# Patient Record
Sex: FEMALE | Race: WHITE | Employment: PART TIME | URBAN - METROPOLITAN AREA
[De-identification: names, ages, dates, MRNs, and addresses within clinical notes are randomized per-mention and may not be internally consistent; named-entity substitution may affect disease eponyms.]

---

## 2018-04-04 ENCOUNTER — HOSPITAL ENCOUNTER (OUTPATIENT)
Dept: OCCUPATIONAL THERAPY | Facility: CLINIC | Age: 23
Setting detail: THERAPIES SERIES
Discharge: HOME | End: 2018-04-04
Payer: COMMERCIAL

## 2018-04-04 PROCEDURE — 97140 MANUAL THERAPY 1/> REGIONS: CPT

## 2018-04-04 PROCEDURE — 97110 THERAPEUTIC EXERCISES: CPT

## 2018-04-04 PROCEDURE — 97165 OT EVAL LOW COMPLEX 30 MIN: CPT

## 2018-04-18 ENCOUNTER — HOSPITAL ENCOUNTER (OUTPATIENT)
Dept: OCCUPATIONAL THERAPY | Facility: CLINIC | Age: 23
Setting detail: THERAPIES SERIES
Discharge: HOME | End: 2018-04-18
Payer: COMMERCIAL

## 2018-04-18 PROCEDURE — 97140 MANUAL THERAPY 1/> REGIONS: CPT

## 2018-04-18 PROCEDURE — 97110 THERAPEUTIC EXERCISES: CPT

## 2018-04-23 ENCOUNTER — HOSPITAL ENCOUNTER (OUTPATIENT)
Dept: OCCUPATIONAL THERAPY | Facility: CLINIC | Age: 23
Setting detail: THERAPIES SERIES
Discharge: HOME | End: 2018-04-23
Payer: COMMERCIAL

## 2018-04-23 PROCEDURE — 97110 THERAPEUTIC EXERCISES: CPT

## 2018-04-25 ENCOUNTER — HOSPITAL ENCOUNTER (OUTPATIENT)
Dept: OCCUPATIONAL THERAPY | Facility: CLINIC | Age: 23
Setting detail: THERAPIES SERIES
Discharge: HOME | End: 2018-04-25
Payer: COMMERCIAL

## 2018-04-25 PROCEDURE — 97110 THERAPEUTIC EXERCISES: CPT

## 2018-05-07 ENCOUNTER — HOSPITAL ENCOUNTER (OUTPATIENT)
Dept: OCCUPATIONAL THERAPY | Facility: CLINIC | Age: 23
Setting detail: THERAPIES SERIES
Discharge: HOME | End: 2018-05-07
Payer: COMMERCIAL

## 2018-05-07 PROCEDURE — 97110 THERAPEUTIC EXERCISES: CPT

## 2018-05-07 PROCEDURE — 97035 APP MDLTY 1+ULTRASOUND EA 15: CPT

## 2018-05-21 ENCOUNTER — HOSPITAL ENCOUNTER (OUTPATIENT)
Dept: OCCUPATIONAL THERAPY | Facility: CLINIC | Age: 23
Setting detail: THERAPIES SERIES
Discharge: HOME | End: 2018-05-21
Payer: COMMERCIAL

## 2018-05-21 PROCEDURE — 97110 THERAPEUTIC EXERCISES: CPT

## 2018-05-21 PROCEDURE — 97530 THERAPEUTIC ACTIVITIES: CPT

## 2018-05-30 ENCOUNTER — HOSPITAL ENCOUNTER (OUTPATIENT)
Dept: OCCUPATIONAL THERAPY | Facility: CLINIC | Age: 23
Setting detail: THERAPIES SERIES
Discharge: HOME | End: 2018-05-30
Payer: COMMERCIAL

## 2018-05-30 PROCEDURE — 97110 THERAPEUTIC EXERCISES: CPT

## 2018-05-30 PROCEDURE — 97035 APP MDLTY 1+ULTRASOUND EA 15: CPT

## 2018-08-21 ENCOUNTER — OFFICE VISIT (OUTPATIENT)
Dept: OBGYN CLINIC | Age: 23
End: 2018-08-21
Payer: COMMERCIAL

## 2018-08-21 ENCOUNTER — HOSPITAL ENCOUNTER (OUTPATIENT)
Age: 23
Setting detail: SPECIMEN
Discharge: HOME | End: 2018-08-21
Payer: COMMERCIAL

## 2018-08-21 VITALS
BODY MASS INDEX: 23.54 KG/M2 | WEIGHT: 150 LBS | SYSTOLIC BLOOD PRESSURE: 133 MMHG | DIASTOLIC BLOOD PRESSURE: 80 MMHG | HEIGHT: 67 IN | HEART RATE: 102 BPM

## 2018-08-21 DIAGNOSIS — Z01.419 WELL FEMALE EXAM WITH ROUTINE GYNECOLOGICAL EXAM: Primary | ICD-10-CM

## 2018-08-21 DIAGNOSIS — Z32.02 NEGATIVE PREGNANCY TEST: ICD-10-CM

## 2018-08-21 DIAGNOSIS — E28.9 OVARIAN DYSFUNCTION: ICD-10-CM

## 2018-08-21 LAB
CONTROL: PRESENT
PREGNANCY TEST URINE, POC: NEGATIVE

## 2018-08-21 PROCEDURE — 99385 PREV VISIT NEW AGE 18-39: CPT | Performed by: OBSTETRICS & GYNECOLOGY

## 2018-08-21 PROCEDURE — 81025 URINE PREGNANCY TEST: CPT | Performed by: OBSTETRICS & GYNECOLOGY

## 2018-08-21 RX ORDER — BUPROPION HYDROCHLORIDE 150 MG/1
150 TABLET, EXTENDED RELEASE ORAL 2 TIMES DAILY
COMMUNITY

## 2018-08-21 NOTE — PROGRESS NOTES
Merlin Myron  8/21/2018              20 y.o. Chief Complaint   Patient presents with    Gynecologic Exam    Establish Care              No referring provider defined for this encounter. The patient was seen and examined. She has no chief complaint today and is here for her annual exam.  Her bowels are regular. There are no voiding complaints. She denies any bloating. HPI : 22yo G0 for annual exam, no gyn issues. ________________________________________________________________________    Past Medical History:   Diagnosis Date    Anxiety     Depression                                                                    Past Surgical History:   Procedure Laterality Date    WRIST SURGERY  03/05/2018     Family History   Problem Relation Age of Onset    Heart Disease Paternal Grandfather     Cancer Paternal Grandfather         skin    Heart Disease Paternal Grandmother     Cancer Paternal Grandmother         skin    Heart Disease Maternal Grandmother     Heart Disease Maternal Grandfather     Cancer Maternal Grandfather         skin    Cancer Mother         skin     History   Smoking Status    Never Smoker   Smokeless Tobacco    Never Used     History   Alcohol Use No       MEDICATIONS:  Current Outpatient Prescriptions   Medication Sig Dispense Refill    buPROPion (WELLBUTRIN SR) 150 MG extended release tablet Take 150 mg by mouth 2 times daily      Loratadine (CLARITIN PO) Take by mouth       No current facility-administered medications for this visit. ALLERGIES:  Patient has no known allergies. Immunization status: stated as current, but no records available. Gynecologic History:     Patient's last menstrual period was 07/20/2018.     Sexually Active: Yes    STD History: No     Permanent Sterilization: No   Reversible Birth Control: No        Hormone Replacement Exposure: No      Family History of Breast, Ovarian , Colon or Uterine Cancer: No   If YES see scanned groomed female. Her BMI was reviewed. Nutritional decision making was discussed. Skin:  There was a Normal Inspection of the skin without rashes or lesions. There were no rashes. (Papular, Maculopapular, Hives, Pustular, Macular)     There were no lesions (Ulcers, Erythema, Abn. Appearing Nevi)        Lymphatic:  No Lymph Nodes were Palpable in the neck , axilla or groin. Inguinal lymph nodes wnl     Neck and EENT:  The neck was supple. There were no masses     Respiratory: The lungs were auscultated and found to be clear. There were no rales, rhonchi or wheezes. There was a good respiratory effort. Cardiovascular: The heart was in a regular rate and rhythm. . No S3 or S4. There was no murmur appreciated. Location, grade, and radiation are not applicable. Extremities: The patients extremities were without calf tenderness, edema, or varicosities. There was full range of motion in all four extremities. Abdomen: The abdomen was soft and non-tender. There were good bowel sounds in all quadrants and there was no guarding, rebound or rigidity. On evaluation there was no evidence of hepatosplenomegaly and there was no costal vertebral kaveh tenderness bilaterally. No hernias were appreciated. Abdominal Scars: none    Psych: The patient had a normal Orientation to: Time, Place, Person, and Situation  There is no Mood / Affect changes    Breast:  (Chest)  normal appearance, no masses or tenderness  Self breast exams were reviewed in detail. Literature was given. Pelvic Exam:  Vulva and vagina appear normal. Bimanual exam reveals normal uterus and adnexa. Rectal Exam:  def      Musculosk:  Normal Gait and station was noted. Digits were evaluated without abnormal findings. Range of motion, stability and strength were evaluated and found to be appropriate for the patients age. ASSESSMENT:      20 y. o. Annual  1. Well female exam with routine gynecological exam    2. Negative pregnancy test    3. Ovarian dysfunction         Chief Complaint   Patient presents with    Gynecologic Exam    Establish Care          Past Medical History:   Diagnosis Date    Anxiety     Depression          There is no problem list on file for this patient. Hereditary Breast, Ovarian, Colon and Uterine Cancer screening Done. Tobacco & Secondary smoke risks reviewed; instructed on cessation and avoidance      Counseling Completed:  annual           PLAN:    Repeat pap every 1 year if negative. Mammograms every 1 year. If 37 yo and last mammogram was negative. Calcium and Vitamin D dosing reviewed. Colonoscopy screening reviewed as well as onset for bone density testing. Birth control and barrier recommendations discussed. STD counseling and prevention reviewed. Gardisil counseling completed for all patients 7-35 yo. Routine health maintenance per patients PCP.

## 2018-09-10 LAB — CYTOLOGY REPORT: NORMAL

## 2018-09-14 ENCOUNTER — TELEPHONE (OUTPATIENT)
Dept: OBGYN CLINIC | Age: 23
End: 2018-09-14

## 2018-09-26 DIAGNOSIS — Z00.00 PREVENTATIVE HEALTH CARE: Primary | ICD-10-CM
